# Patient Record
Sex: FEMALE | Race: WHITE
[De-identification: names, ages, dates, MRNs, and addresses within clinical notes are randomized per-mention and may not be internally consistent; named-entity substitution may affect disease eponyms.]

---

## 2018-03-15 ENCOUNTER — HOSPITAL ENCOUNTER (EMERGENCY)
Dept: HOSPITAL 74 - JER | Age: 83
Discharge: HOME | End: 2018-03-15
Payer: COMMERCIAL

## 2018-03-15 VITALS — BODY MASS INDEX: 38.5 KG/M2

## 2018-03-15 VITALS — HEART RATE: 52 BPM | TEMPERATURE: 98.4 F

## 2018-03-15 VITALS — DIASTOLIC BLOOD PRESSURE: 41 MMHG | SYSTOLIC BLOOD PRESSURE: 164 MMHG

## 2018-03-15 DIAGNOSIS — E78.00: ICD-10-CM

## 2018-03-15 DIAGNOSIS — Z85.72: ICD-10-CM

## 2018-03-15 DIAGNOSIS — Z86.73: ICD-10-CM

## 2018-03-15 DIAGNOSIS — Z85.43: ICD-10-CM

## 2018-03-15 DIAGNOSIS — I10: Primary | ICD-10-CM

## 2018-03-15 NOTE — PDOC
History of Present Illness





- General


Chief Complaint: Blood Pressure Problem


Stated Complaint: DIZZINESS,BLOOD PRESSURE PROBLEM


Time Seen by Provider: 03/15/18 05:47





- History of Present Illness


Initial Comments: 





03/15/18 06:15


The patient is an 82 year old female with a history of HTN, ovarian CA and 

Lymphoma in remission, who presents for evaluation of high blood pressure.  The 

patient reports that she has a labile blood pressure that has been difficult to 

control on an outpatient basis with her cardiologist. Dr. Hopper.  She notes 

that her blood pressures usually run high despite her BP medications. She noted 

an episode of nausea and non-bilious, non-bloody vomiting 1 day ago with an 

associated nose bleed prompting her presentation to the ED today for concerns 

of elevated BP.  She also notes some headache that she describes as 

intermittent sharp pains just under the skin of her scalp.  She otherwise 

denies fevers, chills, vision changes, SOB, chest pain, abdominal pain, numbness

, weakness, or changes with urination or bowel movements.





Past History





- Past Medical History


Allergies/Adverse Reactions: 


 Allergies











Allergy/AdvReac Type Severity Reaction Status Date / Time


 


No Known Drug Allergies Allergy Unknown  Verified 03/15/18 05:45


 


[place] NUTS AdvReac Severe Nausea Uncoded 03/15/18 05:45











Home Medications: 


Ambulatory Orders





Olmesartan Medoxomil [Benicar -] 40 mg PO DAILY 10/15/12 


Aspirin [ASA -] 81 mg PO HS 10/16/12 


Atorvastatin Calcium [Lipitor] 10 mg PO HS 10/16/12 


Acetaminophen [Tylenol .Extra-Strength -] 1,000 mg PO DAILY 11/23/16 


Cholecalciferol (Vitamin D3) [Vitamin D3] 2,000 unit PO DAILY 11/23/16 


Labetalol HCl 300 mg PO 2 TABS BID  tablet 05/03/17 


Amlodipine Besylate [Norvasc -] 10 mg PO DAILY 03/15/18 








Anemia: Yes


Asthma: No


Cancer: Yes (CHEMOTHERAPY AT PRESENT FOR LYMPHOMA, ovarian ca)


Cardiac Disorders: Yes (SOB)


CVA: Yes (TIA)


COPD: No


CHF: No


Dementia: No


Diabetes: No


GI Disorders: No


 Disorders: No


HTN: Yes


Hypercholesterolemia: Yes


Liver Disease: No


Seizures: No


Thyroid Disease: No





- Surgical History


Abdominal Surgery: No


Appendectomy: No


Cardiac Surgery: No


Cholecystectomy: No


Lung Surgery: No


Neurologic Surgery: No


Orthopedic Surgery: No





- Suicide/Smoking/Psychosocial Hx


Smoking History: Former smoker


Have you smoked in the past 12 months: No


If you are a former smoker, when did you quit?: 10 years


Information on smoking cessation initiated: No


Hx Alcohol Use: No


Drug/Substance Use Hx: No


Substance Use Type: Alcohol


Hx Substance Use Treatment: No





**Review of Systems





- Review of Systems


Comments:: 





03/15/18 06:23


Constitutional: No fevers, chills, fatigue, malaise


HEENT: No Rhinorrhea, nasal congestion, visual changes


Cardiovascular: No chest pain, syncope, palpitations, lightheadedness


Respiratory: No Cough, SOB, Hemoptysis,


Gastrointestinal: No Abdominal pain, Constipation, Diarrhea, Melena


Genitourinary: No Dysuria, Frequency, Urgency, Hesitancy, Hematuria, Flank pain


Musculoskeletal: No Myalgia, arthralgia


Skin: No rashes, itching, bruising, pallor


Neurologic: No Headache, Dizziness, Numbness, Weakness, or Tingling


Psychiatric: No Hallucinations. No SI or HI








*Physical Exam





- Vital Signs


 Last Vital Signs











Temp Pulse Resp BP Pulse Ox


 


 98.4 F   52 L  18   177/52   95 


 


 03/15/18 05:46  03/15/18 05:46  03/15/18 05:46  03/15/18 05:46  03/15/18 05:46














- Physical Exam


Comments: 





03/15/18 06:27


General Appearance: Nourished. No Apparent Distress


HEENT: EOMI, GAVINO. No Pharyngeal Erythema, Tonsillar Exudate, Tonsillar Erythema


Neck: No Cervical Lymphadenopathy


Respiratory/Chest: Lungs Clear, Normal Breath Sounds. No Crackles, Rales, 

Rhonchi, Wheezing


Cardiovascular: Regular Rhythm, Regular Rate. No Murmur, Gallops, Rubs


Gastrointestinal/Abdominal: Normal Bowel Sounds, Soft. No Guarding, Rebound, 

Tenderness


Musculoskeletal: No CVA Tenderness


Extremity: Normal Capillary Refill


Integumentary: Normal Color, Dry, Warm


Neurologic: Fully Oriented, Alert, Normal Mood/Affect, Normal Response, 





**Heart Score/ECG Review


  ** #1


ECG reviewed & interpreted by me at: 06:27 (Sinus Dimas with 1st degree AV Block

)


General ECG Interpretation: Sinus Rhythm, Normal Intervals, No acute ischemic 

changes





Medical Decision Making





- Medical Decision Making





03/15/18 06:28


The patient is an 82 year old female with a history of HTN, ovarian CA and 

Lymphoma in remission, who presents for evaluation of high blood pressure.  The 

patient remains asymptomatic here in the ED and wishes to be discharged home.  

The patient appears clinically well on exam with a normal physical exam.  The 

patient reports that she took her BP medications prior to presentation to the 

ED.  We are comfortable discharging the patient home with cardiology follow up 

with Dr. Hopper within the next 1-2 days.  EKG obtained was unremarkable.  We 

discussed the plan with the patient as well as return precautions including but 

not limited to: chest pain, SOB, headache, weakness in the extremities, or 

vomiting.  The patient voiced understanding and is agreeable with the plan and 

will call Dr. Hopper's office today.





*DC/Admit/Observation/Transfer


Diagnosis at time of Disposition: 


High blood pressure


Qualifiers:


 Hypertension type: unspecified Qualified Code(s): I10 - Essential (primary) 

hypertension








- Discharge Dispostion


Disposition: HOME


Condition at time of disposition: Good


Admit: No





- Referrals


Referrals: 


Eldon Gordon MD [Primary Care Provider] - 





- Patient Instructions


Printed Discharge Instructions:  DI for High Blood Pressure, How to Monitor 

Your Blood Pressure at Home


Additional Instructions: 


Please return to the ER if you experience concerning or worsening symptoms 

including worsening headache, vomiting, chest pain, or difficulty breathing.





Your EKG was unremarkable here in the ER.  It is extremely important that you 

call to schedule a follow up appointment with your cardiologist Dr. Hopper 

within the next 1-2 days to discuss your ER visit and further management of 

your symptoms.





- Post Discharge Activity

## 2018-03-15 NOTE — EKG
Test Reason : 

Blood Pressure : ***/*** mmHG

Vent. Rate : 048 BPM     Atrial Rate : 048 BPM

   P-R Int : 212 ms          QRS Dur : 098 ms

    QT Int : 502 ms       P-R-T Axes : -13 -08 019 degrees

   QTc Int : 448 ms

 

SINUS BRADYCARDIA WITH 1ST DEGREE A-V BLOCK

POSSIBLE ANTERIOR INFARCT , AGE UNDETERMINED

ABNORMAL ECG

WHEN COMPARED WITH ECG OF 30-NOV-2010 09:06,

[place] INTERVAL HAS INCREASED

VENT. RATE HAS DECREASED BY  36 BPM

BORDERLINE CRITERIA FOR ANTERIOR INFARCT ARE NOW PRESENT

Confirmed by DILLON RUIZ MD ([id number]) on 3/15/2018 12:40:00 PM

 

Referred By:             Confirmed By:DILLON RUIZ MD

## 2018-03-15 NOTE — PDOC
Attending Attestation





- HPI


HPI: 





03/15/18 06:30


The patient is an 82 year old female with a significant PMH of ovarian CA, 

lymphoma (both in remission), and HTN who presents to the emergency department 

for evaluation of high blood pressure and headache. She also notes an episode 

of nausea and NBNB vomiting about 1 day ago. The patient denies fevers and 

chills. She denies weakness or numbness. 





Allergies: NKDA


PCP: Dr. Gordon


Cardiologist: Dr. Hopper








<Ge Garcia - Last Filed: 03/15/18 06:31>





- Resident


Resident Name: Rm Arrington





- ED Attending Attestation


I have performed the following: I have examined & evaluated the patient, The 

case was reviewed & discussed with the resident, I agree w/resident's findings 

& plan, Exceptions are as noted





- Physicial Exam


PE: 





03/15/18 06:26


*Physical Exam





General Appearance: Yes: Appropriately Dressed.  No: Apparent Distress, 

Intoxicated


HEENT: positive: EOMI, GAVINO, Normal ENT Inspection, Normal Voice, TMs Normal, 

Pharynx Normal.  negative: Pale Conjunctivae, Photophobia, Scleral Icterus (R), 

Scleral Icterus (L)


Neck: positive: Trachea midline, Normal Thyroid, Supple.  negative: Tender, 

Rigid, Carotid bruit, Stridor, Lymphadenopathy (R), Lymphadenopathy (L), 

Thyromegaly


Respiratory/Chest: positive: Lungs Clear, Normal Breath Sounds.  negative: 

Chest Tender, Respiratory Distress, Accessory Muscle Use, Labored Respiration, 

RES, Crackles, Rales, Rhonchi, Stridor, Wheezing, Dullness


Cardiovascular: positive: Regular Rhythm, Regular Rate, S1, S2.  negative: Edema

, JVD, Murmur, Bradycardia, Tachycardia


Vascular Pulses: Dorsalis-Pedis (R): 2+, Doralis-Pedis (L): 2+


Gastrointestinal/Abdominal: positive: Normal Bowel Sounds, Flat, Soft.  negative

: Tender, Organomegaly, Pulsatile Mass, Increased Bowel Sounds, Decreased BS, 

Distended, Guarding, Rebound, Hernia, Hepatomegaly, Spleenomegaly


Lymphatic: negative: Adenopathy, Tenderness


Musculoskeletal: positive: Normal Inspection.  negative: CVA Tenderness, 

Decreased Range of Motion


Extremity: positive: Normal Capillary Refill, Normal Inspection, Normal Range 

of Motion, Pelvis Stable.  negative: Tender, Pedal Edema, Swelling, Erythema


Integumentary: positive: Normal Color, Dry, Warm.  negative: Cyanotic, Erythema

, Jaundice, Rash


Neurologic: positive: CNs II-XII NML intact, Fully Oriented, Alert, Normal Mood/

Affect, Motor Strength 5/5.  negative: EOM Palsy, Facial Droop, Sensory Deficit





- Medical Decision Making





03/15/18 19:39


pt treated and released





<Mao Cummings - Last Filed: 03/15/18 19:39>





**Heart Score/ECG Review


  ** #1





03/15/18 06:31


Vent rate 48 bpm





Sinus bradycardia with 1st degree AV block 


Possible anterior infarct, age undetermined. 


Anormal ECG. 





<Ge Garcia - Last Filed: 03/15/18 06:31>